# Patient Record
Sex: MALE | Race: BLACK OR AFRICAN AMERICAN
[De-identification: names, ages, dates, MRNs, and addresses within clinical notes are randomized per-mention and may not be internally consistent; named-entity substitution may affect disease eponyms.]

---

## 2023-02-25 ENCOUNTER — HOSPITAL ENCOUNTER (EMERGENCY)
Dept: HOSPITAL 4 - SED | Age: 28
Discharge: HOME | End: 2023-02-25
Payer: MEDICAID

## 2023-02-25 VITALS — HEIGHT: 69 IN | BODY MASS INDEX: 28.14 KG/M2 | WEIGHT: 190 LBS

## 2023-02-25 VITALS — SYSTOLIC BLOOD PRESSURE: 141 MMHG

## 2023-02-25 DIAGNOSIS — F17.200: ICD-10-CM

## 2023-02-25 DIAGNOSIS — R10.9: ICD-10-CM

## 2023-02-25 DIAGNOSIS — Z79.899: ICD-10-CM

## 2023-02-25 DIAGNOSIS — R25.2: Primary | ICD-10-CM

## 2023-02-25 DIAGNOSIS — M54.6: ICD-10-CM

## 2023-02-25 NOTE — NUR
Pt bib self from home, ambulated to bed 5. Pt is A&Ox4, able to make needs 
known. Pt c/o on and off lower back pain for two months. Pt rates pain 3/10 at 
this time. Pt describes pain as cramping. Pt states pain intensifies upon 
exertion. Pt denies N/V. Pt denies SOB. Safety measures in place.

## 2023-02-25 NOTE — NUR
Patient given written and verbal discharge instructions and verbalizes 
understanding.  ER Dr Hinson discussed with patient the results and 
treatment provided. Patient in stable condition. ID arm band removed. 

Rx of Robaxin given. Patient educated on pain management and to follow up with 
PMD. Pain Scale 0/10.

Opportunity for questions provided and answered. Medication side effect fact 
sheet provided.